# Patient Record
Sex: FEMALE | Race: WHITE | NOT HISPANIC OR LATINO | URBAN - METROPOLITAN AREA
[De-identification: names, ages, dates, MRNs, and addresses within clinical notes are randomized per-mention and may not be internally consistent; named-entity substitution may affect disease eponyms.]

---

## 2024-07-15 ENCOUNTER — LAB OUTSIDE AN ENCOUNTER (OUTPATIENT)
Dept: URBAN - METROPOLITAN AREA CLINIC 68 | Facility: CLINIC | Age: 74
End: 2024-07-15

## 2024-07-15 ENCOUNTER — OFFICE VISIT (OUTPATIENT)
Dept: URBAN - METROPOLITAN AREA CLINIC 68 | Facility: CLINIC | Age: 74
End: 2024-07-15
Payer: MEDICARE

## 2024-07-15 ENCOUNTER — DASHBOARD ENCOUNTERS (OUTPATIENT)
Age: 74
End: 2024-07-15

## 2024-07-15 ENCOUNTER — TELEPHONE ENCOUNTER (OUTPATIENT)
Dept: URBAN - METROPOLITAN AREA CLINIC 68 | Facility: CLINIC | Age: 74
End: 2024-07-15

## 2024-07-15 VITALS
HEIGHT: 63 IN | WEIGHT: 166 LBS | DIASTOLIC BLOOD PRESSURE: 82 MMHG | SYSTOLIC BLOOD PRESSURE: 126 MMHG | BODY MASS INDEX: 29.41 KG/M2

## 2024-07-15 DIAGNOSIS — D50.9 IRON DEFICIENCY ANEMIA, UNSPECIFIED IRON DEFICIENCY ANEMIA TYPE: ICD-10-CM

## 2024-07-15 PROBLEM — 87522002: Status: ACTIVE | Noted: 2024-07-15

## 2024-07-15 PROCEDURE — 99204 OFFICE O/P NEW MOD 45 MIN: CPT | Performed by: INTERNAL MEDICINE

## 2024-07-15 RX ORDER — FOLIC ACID/MULTIVIT,IRON,MINER 0.4MG-18MG
1 PO TID TABLET ORAL
Qty: 240 EACH | Refills: 0 | Status: ACTIVE | COMMUNITY

## 2024-07-15 NOTE — HPI-TODAY'S VISIT:
Case of a 74-year-old female patient that comes in today for follow-up of iron deficiency anemia.  Patient recently was feeling fatigued tired on the work blood work by her primary care physician.  Blood work was remarkable for new onset iron deficiency anemia.  She was referred to the ER hospitalized underwent blood transfusions EGD and colonoscopy.  No findings to account for her iron deficiency anemia will identified.  Patient was discharged and comes in today for follow-up.  Upon interrogation she denies melena hematochezia hematemesis coffee-ground emesis.  Denies vaginal bleeding.

## 2024-07-17 ENCOUNTER — LAB OUTSIDE AN ENCOUNTER (OUTPATIENT)
Dept: URBAN - METROPOLITAN AREA CLINIC 68 | Facility: CLINIC | Age: 74
End: 2024-07-17

## 2024-07-18 ENCOUNTER — TELEPHONE ENCOUNTER (OUTPATIENT)
Dept: URBAN - METROPOLITAN AREA CLINIC 68 | Facility: CLINIC | Age: 74
End: 2024-07-18

## 2024-07-29 ENCOUNTER — OFFICE VISIT (OUTPATIENT)
Dept: URBAN - METROPOLITAN AREA CLINIC 67 | Facility: CLINIC | Age: 74
End: 2024-07-29

## 2024-07-30 ENCOUNTER — TELEPHONE ENCOUNTER (OUTPATIENT)
Dept: URBAN - METROPOLITAN AREA CLINIC 68 | Facility: CLINIC | Age: 74
End: 2024-07-30

## 2024-08-02 ENCOUNTER — LAB OUTSIDE AN ENCOUNTER (OUTPATIENT)
Dept: URBAN - METROPOLITAN AREA CLINIC 68 | Facility: CLINIC | Age: 74
End: 2024-08-02

## 2024-08-15 ENCOUNTER — LAB OUTSIDE AN ENCOUNTER (OUTPATIENT)
Dept: URBAN - METROPOLITAN AREA CLINIC 68 | Facility: CLINIC | Age: 74
End: 2024-08-15

## 2024-08-18 LAB
% SATURATION: 22
ABSOLUTE BASOPHILS: 46
ABSOLUTE EOSINOPHILS: 347
ABSOLUTE LYMPHOCYTES: 2048
ABSOLUTE MONOCYTES: 293
ABSOLUTE NEUTROPHILS: 4967
BASOPHILS: 0.6
CBC MORPHOLOGY: (no result)
EOSINOPHILS: 4.5
FERRITIN: 37
HEMATOCRIT: 40.1
HEMOGLOBIN: 12.6
IRON BINDING CAPACITY: 374
IRON, TOTAL: 82
LYMPHOCYTES: 26.6
MCH: 26.4
MCHC: 31.4
MCV: 84.1
MONOCYTES: 3.8
MPV: (no result)
NEUTROPHILS: 64.5
PLATELET COUNT: 337
RDW: 27.5
RED BLOOD CELL COUNT: 4.77
WHITE BLOOD CELL COUNT: 7.7

## 2024-08-21 ENCOUNTER — LAB OUTSIDE AN ENCOUNTER (OUTPATIENT)
Dept: URBAN - METROPOLITAN AREA CLINIC 68 | Facility: CLINIC | Age: 74
End: 2024-08-21

## 2024-08-21 ENCOUNTER — OFFICE VISIT (OUTPATIENT)
Dept: URBAN - METROPOLITAN AREA CLINIC 68 | Facility: CLINIC | Age: 74
End: 2024-08-21
Payer: MEDICARE

## 2024-08-21 VITALS — HEIGHT: 63 IN | BODY MASS INDEX: 29.41 KG/M2 | WEIGHT: 166 LBS

## 2024-08-21 DIAGNOSIS — D50.9 IRON DEFICIENCY ANEMIA, UNSPECIFIED IRON DEFICIENCY ANEMIA TYPE: ICD-10-CM

## 2024-08-21 PROCEDURE — 99214 OFFICE O/P EST MOD 30 MIN: CPT | Performed by: INTERNAL MEDICINE

## 2024-08-21 RX ORDER — FOLIC ACID/MULTIVIT,IRON,MINER 0.4MG-18MG
1 PO TID TABLET ORAL
Qty: 240 EACH | Refills: 0 | Status: ACTIVE | COMMUNITY

## 2024-08-21 NOTE — HPI-TODAY'S VISIT:
Case of a 74-year-old female patient that comes in today for follow-up of iron deficiency anemia.  Patient recently was feeling fatigued tired on the work blood work by her primary care physician.  Blood work was remarkable for new onset iron deficiency anemia.  She was referred to the ER hospitalized underwent blood transfusions EGD and colonoscopy.  No findings to account for her iron deficiency anemia will identified. For further evaluation he underwetn VCE that was normal. He comes in today for follow up. She denies melena hematochezia hematemesis coffee-ground emesis.  Denies vaginal bleeding. She continues on iron supplementation.

## 2024-09-19 ENCOUNTER — LAB OUTSIDE AN ENCOUNTER (OUTPATIENT)
Dept: URBAN - METROPOLITAN AREA CLINIC 68 | Facility: CLINIC | Age: 74
End: 2024-09-19

## 2024-09-23 ENCOUNTER — TELEPHONE ENCOUNTER (OUTPATIENT)
Dept: URBAN - METROPOLITAN AREA CLINIC 68 | Facility: CLINIC | Age: 74
End: 2024-09-23

## 2024-09-23 LAB
% SATURATION: 10
ABSOLUTE BASOPHILS: 120
ABSOLUTE EOSINOPHILS: 416
ABSOLUTE LYMPHOCYTES: 1777
ABSOLUTE MONOCYTES: 334
ABSOLUTE NEUTROPHILS: 3654
BASOPHILS: 1.9
EOSINOPHILS: 6.6
FERRITIN: 12
HEMATOCRIT: 40.9
HEMOGLOBIN: 13.1
IRON BINDING CAPACITY: 457
IRON, TOTAL: 44
LYMPHOCYTES: 28.2
MCH: 28.8
MCHC: 32
MCV: 89.9
MONOCYTES: 5.3
MPV: (no result)
NEUTROPHILS: 58
PLATELET COUNT: 356
RDW: 16.7
RED BLOOD CELL COUNT: 4.55
WHITE BLOOD CELL COUNT: 6.3